# Patient Record
Sex: FEMALE | Race: WHITE
[De-identification: names, ages, dates, MRNs, and addresses within clinical notes are randomized per-mention and may not be internally consistent; named-entity substitution may affect disease eponyms.]

---

## 2017-04-03 ENCOUNTER — HOSPITAL ENCOUNTER (OUTPATIENT)
Dept: HOSPITAL 62 - RAD | Age: 77
End: 2017-04-03
Attending: ORTHOPAEDIC SURGERY
Payer: MEDICARE

## 2017-04-03 DIAGNOSIS — M13.851: Primary | ICD-10-CM

## 2017-04-03 PROCEDURE — 73501 X-RAY EXAM HIP UNI 1 VIEW: CPT

## 2017-04-03 PROCEDURE — 3E0U33Z INTRODUCTION OF ANTI-INFLAMMATORY INTO JOINTS, PERCUTANEOUS APPROACH: ICD-10-PCS | Performed by: RADIOLOGY

## 2017-04-03 PROCEDURE — 77002 NEEDLE LOCALIZATION BY XRAY: CPT

## 2017-04-03 PROCEDURE — 20610 DRAIN/INJ JOINT/BURSA W/O US: CPT

## 2017-06-13 ENCOUNTER — HOSPITAL ENCOUNTER (OUTPATIENT)
Dept: HOSPITAL 62 - WI | Age: 77
End: 2017-06-13
Attending: INTERNAL MEDICINE
Payer: MEDICARE

## 2017-06-13 DIAGNOSIS — Z12.31: Primary | ICD-10-CM

## 2017-06-13 PROCEDURE — 77063 BREAST TOMOSYNTHESIS BI: CPT

## 2017-06-13 PROCEDURE — 77067 SCR MAMMO BI INCL CAD: CPT

## 2017-06-13 PROCEDURE — G0202 SCR MAMMO BI INCL CAD: HCPCS

## 2017-06-13 NOTE — WOMENS IMAGING REPORT
EXAM DESCRIPTION:  3D SCREENING MAMMO BILAT



COMPLETED DATE/TIME:  6/13/2017 3:07 pm



REASON FOR STUDY:  Z12.31, ROUTINE SCREENING MAMMO Z12.31  ENCNTR SCREEN MAMMOGRAM FOR MALIGNANT NEOP
LASM OF NELLIE



COMPARISON:  2/17/2016 and 2/5/2015.



TECHNIQUE:  Standard craniocaudal and mediolateral oblique views of each breast recorded using digita
l acquisition and breast tomosynthesis.



LIMITATIONS:  None.



FINDINGS:  Findings present which are benign by mammographic criteria.  No suspicious masses, calcifi
cations or architectural distortion.

Pertinent benign findings: Stable small circumscribed masses in the right breast.  Stable calcificati
ons.

Read with the assistance of CAD.

.[facility] - R2 Cenova Version 1.3

.[facility] - R2 Cenova Version 1.3

.[facility] - R2 Cenova Version 2.4

.[facility] - R2 Cenova Version 2.4

.[facility] - R2  Version 9.2

Benign mammographic findings may include one or more of the following:  Smooth masses, popcorn/rim/co
arse calcifications, asymmetries, post-procedure changes, and lesions with long-standing stability.



IMPRESSION:  BENIGN MAMMOGRAPHIC FINDINGS.  BIRADS 2



BREAST DENSITY:  a. The breasts are almost entirely fatty.



BIRAD:  2 BENIGN FINDING(S)



RECOMMENDATION:  RECOMMENDATION: ROUTINE SCREENING



COMMENT:  The patient has been notified of the results by letter per SA requirements. Additional no
tification policies are in place for contacting patient with suspicious or incomplete findings.

Quality ID #225: The American College of Radiology recommends an annual screening mammogram for women
 aged 40 years or over. This facility utilizes a reminder system to ensure that all patients receive 
reminder letters, and/or direct phone calls for appointments. This includes reminders for routine scr
eening mammograms, diagnostic mammograms, or other Breast Imaging Interventions when appropriate.  Th
is patient will be placed in the appropriate reminder system.

The American College of Radiology (ACR) has developed recommendations for screening MRI of the breast
s in certain patient populations, to be used in conjunction with mammography.  Breast MRI surveillanc
e may be appropriate for women with more than 20% lifetime risk of developing breast cancer  as deter
mined by genetic testing, significant family history of the disease, or history of mantle radiation f
or Hodgkins Disease.  ACR Practice Guidelines 2008.

[employer]



PQRS 6045F: Fluoroscopic imaging is not utilized for breast tomosynthesis.



TECHNICAL DOCUMENTATION:  FINDING NUMBER: (1)

ASSESSMENT: (1)

JOB ID:  [id number]

 2011 [employer]- All Rights Reserved

## 2017-08-25 ENCOUNTER — HOSPITAL ENCOUNTER (EMERGENCY)
Dept: HOSPITAL 62 - ER | Age: 77
LOS: 1 days | Discharge: HOME | End: 2017-08-26
Payer: MEDICARE

## 2017-08-25 DIAGNOSIS — D64.9: ICD-10-CM

## 2017-08-25 DIAGNOSIS — Z79.82: ICD-10-CM

## 2017-08-25 DIAGNOSIS — S67.196A: ICD-10-CM
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
## 2017-08-25 DIAGNOSIS — Z88.8: ICD-10-CM
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
## 2017-08-25 DIAGNOSIS — L76.22: Primary | ICD-10-CM

## 2017-08-25 DIAGNOSIS — Z91.018: ICD-10-CM

## 2017-08-25 DIAGNOSIS — E11.9: ICD-10-CM

## 2017-08-25 DIAGNOSIS — Z96.641: ICD-10-CM

## 2017-08-25 LAB
ANION GAP SERPL CALC-SCNC: 9 MMOL/L (ref 5–19)
BASOPHILS # BLD AUTO: 0 10^3/UL (ref 0–0.2)
BASOPHILS NFR BLD AUTO: 0.3 % (ref 0–2)
BUN SERPL-MCNC: 13 MG/DL (ref 7–20)
CALCIUM: 10 MG/DL (ref 8.4–10.2)
CHLORIDE SERPL-SCNC: 98 MMOL/L (ref 98–107)
CO2 SERPL-SCNC: 28 MMOL/L (ref 22–30)
CREAT SERPL-MCNC: 0.8 MG/DL (ref 0.52–1.25)
EOSINOPHIL # BLD AUTO: 0.1 10^3/UL (ref 0–0.6)
EOSINOPHIL NFR BLD AUTO: 0.9 % (ref 0–6)
ERYTHROCYTE [DISTWIDTH] IN BLOOD BY AUTOMATED COUNT: 16.5 % (ref 11.5–14)
GLUCOSE SERPL-MCNC: 91 MG/DL (ref 75–110)
HCT VFR BLD CALC: 25.2 % (ref 36–47)
HGB BLD-MCNC: 8.3 G/DL (ref 12–15.5)
HGB HCT DIFFERENCE: -0.3
LYMPHOCYTES # BLD AUTO: 1.9 10^3/UL (ref 0.5–4.7)
LYMPHOCYTES NFR BLD AUTO: 15.9 % (ref 13–45)
MCH RBC QN AUTO: 28.4 PG (ref 27–33.4)
MCHC RBC AUTO-ENTMCNC: 32.9 G/DL (ref 32–36)
MCV RBC AUTO: 87 FL (ref 80–97)
MONOCYTES # BLD AUTO: 1 10^3/UL (ref 0.1–1.4)
MONOCYTES NFR BLD AUTO: 8.4 % (ref 3–13)
NEUTROPHILS # BLD AUTO: 8.8 10^3/UL (ref 1.7–8.2)
NEUTS SEG NFR BLD AUTO: 74.5 % (ref 42–78)
POTASSIUM SERPL-SCNC: 3.6 MMOL/L (ref 3.6–5)
PROTHROMBIN TIME: 13.2 SEC (ref 11.4–15.4)
RBC # BLD AUTO: 2.91 10^6/UL (ref 3.72–5.28)
SODIUM SERPL-SCNC: 134.5 MMOL/L (ref 137–145)
WBC # BLD AUTO: 11.9 10^3/UL (ref 4–10.5)

## 2017-08-25 PROCEDURE — 99284 EMERGENCY DEPT VISIT MOD MDM: CPT

## 2017-08-25 PROCEDURE — 85025 COMPLETE CBC W/AUTO DIFF WBC: CPT

## 2017-08-25 PROCEDURE — 85610 PROTHROMBIN TIME: CPT

## 2017-08-25 PROCEDURE — 80048 BASIC METABOLIC PNL TOTAL CA: CPT

## 2017-08-25 PROCEDURE — 36415 COLL VENOUS BLD VENIPUNCTURE: CPT

## 2017-08-25 NOTE — ER DOCUMENT REPORT
ED General





- General


Chief Complaint: Post Surgical Bleeding


Stated Complaint: LEG PAIN


Time Seen by Provider: 08/25/17 22:48


TRAVEL OUTSIDE OF THE [place] IN LAST 30 DAYS: No





- HPI


Notes: 





76-year-old female history of diabetes presents with bleeding from an incision 

from a total hip replacement she had on August 15 performed in [place] by 

Dr. Castellanos.  It has been bleeding for approximately 4 days but has been 

worsening the last day.  They contacted the orthopedist and did not have any 

recommendations except pressure which they did not feel helped.  She has no 

systemic symptoms with it, chest pain or breathing difficulty.  She is on baby 

aspirin but no other anticoagulants.  No specific pain.  She has had no fever.  

Denies fall.  Bleeding worsens with movement.  Denies fever or other systemic 

symptoms.  Hip replacement was performed due to what sounds like osteoarthritis 

but there was complication so a cable had to be placed.





- Related Data


Allergies/Adverse Reactions: 


 





spironolactone [From Aldactone] Allergy (Verified 08/26/17 00:00)


 


strawberry Allergy (Verified 08/26/17 00:00)


 








Home Medications: 


 Current Home Medications





Aspirin [Ecotrin 81 mg EC Tablet] 162 mg PO DAILY 08/26/17 [History]


Citalopram Hydrobromide [Citalopram HBr] 20 mg PO DAILY 08/26/17 [History]


Cyclosporine 0.05% Oph Emulsio [Restasis 0.05% Oph Emulsion Pf 0.4 ml] 1 drop 

BTH_EYE BID 08/26/17 [History]


Diltiazem HCl [Diltiazem 24Hr Cd] 360 mg PO DAILY 08/26/17 [History]


Metformin HCl [Metformin HCl ER] 750 mg PO DAILY 08/26/17 [History]


Oxycodone HCl 5 mg PO 6XD PRN 08/26/17 [History]


Pravastatin Sodium [Pravastatin Sodium] 40 mg PO DAILY 08/26/17 [History]


Valsartan [Diovan] 160 mg PO DAILY 08/26/17 [History]











Past Medical History





- Social History


Smoking Status: Never Smoker


Family History: Reviewed & Not Pertinent


Patient has suicidal ideation: No


Patient has homicidal ideation: No


Renal/ Medical History: Denies: Hx Peritoneal Dialysis





Review of Systems





- Review of Systems


-: Yes All other systems reviewed and negative





Physical Exam





- Vital signs


Vitals: 


 











Temp Pulse Resp BP Pulse Ox


 


 98.6 F   99   17   130/60 H  98 


 


 08/25/17 22:44  08/25/17 22:44  08/25/17 22:44  08/25/17 22:44  08/25/17 22:44











Interpretation: Hypertensive - Slightly





- Notes


Notes: 





GENERAL: VS as per nursing doc. Well-appearing, well-nourished and in no acute 

distress.





HEAD: Atraumatic, normocephalic.





EYES: Pupils equal round and reactive to light, extraocular movements intact, 

sclera anicteric, no conjunctival injection or discharge.





ENT: Nares patent, oropharynx clear without exudates, moist mucous membranes.





NECK: Normal range of motion, supple without lymphadenopathy.





LUNGS: Breath sounds clear to auscultation bilaterally and equal.  No wheezes 

rales or rhonchi.





HEART: Regular rate and rhythm without murmurs.





ABDOMEN: Soft, non-tender, normoactive bowel sounds.  No guarding, no rebound.  

No masses appreciated.  No [place] sign.





BACK: No CVA tenderness.





EXTREMITIES: Normal range of motion, no calf tenderness, no edema.  There is a 

small subungual hematoma and bruising of the distal phalanx of the right fifth 

digit.  Good but slight decreased range of motion of the right lower extremity 

without pain elicited.





NEUROLOGICAL:  Normal speech. Normal sensory and motor exams. No gross 

cerebellar abnormalities.





PSYCH: Normal mood, normal affect.





SKIN: Warm, dry, there of active oozing from the right leg incision.  This 

appears slightly more anterior lateral then completely lateral like some hip 

incisions.  Appears to be closed with subcutaneous sutures.  At the very 

superior edge there is a very small 2 mm "hole" with active oozing of 

sanguinous fluid.  It appears thinner than blood and there is no coagulating.  

Palpation over the incision itself shows no fluctuance but does increase the 

oozing.  There is no pulsatile bleeding.  There is no erythema or warmth but 

just some old bruising.








Course





- Re-evaluation


Re-evalutation: 





08/26/17 00:08


I discussed with the bleeding and labs with Dr. Jarrell from [facility], 

covering for Dr. Castellanos.  We discussed treatment options and he recommended 

further pressure with a pressure dressing as well as follow-up with her 

orthopedist on Monday.


08/26/17 00:23


I discussed the plan with the patient and family and they were comfortable with 

attempting the dressing at home.  I have placed sterile 4 x 4's over the 

incision covered by an ABD.  Multiple Ace wraps were used which were wrapped 

around her upper thigh then anchored around her waist which appeared to give 

good pressure.  With some observation no significant bleeding was noted.  She 

was able to stand on her legs to help assist with this.  They understand follow-

up needs.





- Vital Signs


Vital signs: 


 











Temp Pulse Resp BP Pulse Ox


 


 98.6 F   99   17   130/60 H  98 


 


 08/25/17 22:44  08/25/17 22:44  08/25/17 22:44  08/25/17 22:44  08/25/17 22:44














- Laboratory


Result Diagrams: 


 08/25/17 23:00





 08/25/17 23:00


Laboratory results interpreted by me: 


 











  08/25/17 08/25/17





  23:00 23:00


 


WBC  11.9 H 


 


RBC  2.91 L 


 


Hgb  8.3 L 


 


Hct  25.2 L 


 


RDW  16.5 H 


 


Absolute Neutrophils  8.8 H 


 


Sodium   134.5 L














Discharge





- Discharge


Clinical Impression: 


 Postoperative bleeding from incision, Anemia, Injury, crush, finger





Condition: Good


Disposition: HOME, SELF-CARE


Additional Instructions: 


Please return if the bleeding appears to be worsening or for other concern.  

Follow-up with your orthopedist on Monday as discussed.  Maintain the dressing 

and Ace wraps as you can.  Light activity for the next 48 hours.


Referrals: 


CLAIRE DE LA GARZA DO [Primary Care Provider] - Follow up as needed

## 2017-08-25 NOTE — RADIOLOGY REPORT (SQ)
EXAM DESCRIPTION:  HIP RIGHT AP/LATERAL



COMPLETED DATE/TIME:  8/25/2017 11:31 pm



REASON FOR STUDY:  Post surgical pain/bleeding.



COMPARISON:  None.



NUMBER OF VIEWS:  Two views.



TECHNIQUE:  AP pelvis and additional frog-leg view of the right hip.



LIMITATIONS:  None.



FINDINGS:  MINERALIZATION: Normal.

RIGHT HIP: Total hip arthroplasty without acute osseous abnormality or hardware complication.

LEFT HIP: Total hip arthroplasty without acute osseous abnormality or hardware complication.

PUBIS AND ISCHIUM: No fracture.

PELVIS: No fracture.

SACRUM: No fracture or dislocation. No worrisome bone lesions.

LOWER LUMBAR SPINE: No fracture or dislocation. No worrisome bone lesions.  No significant disc disea
se.

SOFT TISSUES: No findings.

OTHER: No other significant finding.



IMPRESSION:  NO ACUTE OSSEOUS ABNORMALITY OR HARDWARE COMPLICATION IDENTIFIED.



TECHNICAL DOCUMENTATION:  JOB ID:  [id number]

 2011 [employer]- All Rights Reserved

## 2017-08-26 VITALS — SYSTOLIC BLOOD PRESSURE: 137 MMHG | DIASTOLIC BLOOD PRESSURE: 77 MMHG

## 2017-10-30 ENCOUNTER — HOSPITAL ENCOUNTER (OUTPATIENT)
Dept: HOSPITAL 62 - WELLCARE | Age: 77
End: 2017-10-30
Attending: SPECIALIST
Payer: MEDICARE

## 2017-10-30 DIAGNOSIS — E03.1: ICD-10-CM

## 2017-10-30 DIAGNOSIS — N18.3: Primary | ICD-10-CM

## 2017-10-30 DIAGNOSIS — B96.5: ICD-10-CM

## 2017-10-30 LAB
ALBUMIN SERPL-MCNC: 3.9 G/DL (ref 3.5–5)
ALP SERPL-CCNC: 167 U/L (ref 38–126)
ALT SERPL-CCNC: 21 U/L (ref 9–52)
ANION GAP SERPL CALC-SCNC: 13 MMOL/L (ref 5–19)
AST SERPL-CCNC: 15 U/L (ref 14–36)
BASOPHILS # BLD AUTO: 0 10^3/UL (ref 0–0.2)
BASOPHILS NFR BLD AUTO: 0.5 % (ref 0–2)
BILIRUB DIRECT SERPL-MCNC: 0.4 MG/DL (ref 0–0.4)
BILIRUB SERPL-MCNC: 0.4 MG/DL (ref 0.2–1.3)
BUN SERPL-MCNC: 11 MG/DL (ref 7–20)
CALCIUM: 10.2 MG/DL (ref 8.4–10.2)
CHLORIDE SERPL-SCNC: 107 MMOL/L (ref 98–107)
CO2 SERPL-SCNC: 24 MMOL/L (ref 22–30)
CREAT SERPL-MCNC: 0.73 MG/DL (ref 0.52–1.25)
EOSINOPHIL # BLD AUTO: 0.1 10^3/UL (ref 0–0.6)
EOSINOPHIL NFR BLD AUTO: 1.8 % (ref 0–6)
ERYTHROCYTE [DISTWIDTH] IN BLOOD BY AUTOMATED COUNT: 18 % (ref 11.5–14)
ERYTHROCYTE [SEDIMENTATION RATE] IN BLOOD: 21 MM/HR (ref 0–30)
GLUCOSE SERPL-MCNC: 85 MG/DL (ref 75–110)
HCT VFR BLD CALC: 33.1 % (ref 36–47)
HGB BLD-MCNC: 10.7 G/DL (ref 12–15.5)
HGB HCT DIFFERENCE: -1
LYMPHOCYTES # BLD AUTO: 1.4 10^3/UL (ref 0.5–4.7)
LYMPHOCYTES NFR BLD AUTO: 18 % (ref 13–45)
MCH RBC QN AUTO: 26.5 PG (ref 27–33.4)
MCHC RBC AUTO-ENTMCNC: 32.5 G/DL (ref 32–36)
MCV RBC AUTO: 82 FL (ref 80–97)
MONOCYTES # BLD AUTO: 0.5 10^3/UL (ref 0.1–1.4)
MONOCYTES NFR BLD AUTO: 6.5 % (ref 3–13)
NEUTROPHILS # BLD AUTO: 5.6 10^3/UL (ref 1.7–8.2)
NEUTS SEG NFR BLD AUTO: 73.2 % (ref 42–78)
POTASSIUM SERPL-SCNC: 3.7 MMOL/L (ref 3.6–5)
PROT SERPL-MCNC: 6.7 G/DL (ref 6.3–8.2)
RBC # BLD AUTO: 4.05 10^6/UL (ref 3.72–5.28)
SODIUM SERPL-SCNC: 144 MMOL/L (ref 137–145)
WBC # BLD AUTO: 7.6 10^3/UL (ref 4–10.5)

## 2017-10-30 PROCEDURE — 85025 COMPLETE CBC W/AUTO DIFF WBC: CPT

## 2017-10-30 PROCEDURE — 80053 COMPREHEN METABOLIC PANEL: CPT

## 2017-10-30 PROCEDURE — 85652 RBC SED RATE AUTOMATED: CPT

## 2018-06-01 ENCOUNTER — HOSPITAL ENCOUNTER (OUTPATIENT)
Dept: HOSPITAL 62 - WI | Age: 78
End: 2018-06-01
Attending: NURSE PRACTITIONER
Payer: MEDICARE

## 2018-06-01 DIAGNOSIS — Z12.31: Primary | ICD-10-CM

## 2018-06-01 PROCEDURE — 77067 SCR MAMMO BI INCL CAD: CPT

## 2018-06-01 NOTE — WOMENS IMAGING REPORT
EXAM DESCRIPTION:  BILAT SCREENING MAMMO W/CAD



COMPLETED DATE/TIME:  6/1/2018 9:45 am



REASON FOR STUDY:  SCREENING MAMMO Z12.31  ENCNTR SCREEN MAMMOGRAM FOR MALIGNANT NEOPLASM OF NELLIE



COMPARISON:  2014 to 2017



TECHNIQUE:  Standard craniocaudal and mediolateral oblique views of each breast recorded using digita
l acquisition.



LIMITATIONS:  None.



FINDINGS:  No masses, calcifications or architectural distortion. No areas of suspicion.

Read with the assistance of CAD.

.[facility] - R2 Cenova Version 1.3

.[facility] - R2 Cenova Version 1.3

.[facility] - R2 Cenova Version 2.4

.[facility] - R2 Cenova Version 2.4

.[facility] - R2  Version 9.2



IMPRESSION:  NORMAL MAMMOGRAM.  BIRADS 1.



BREAST DENSITY:  b. There are scattered areas of fibroglandular density.



BIRAD:  1 NEGATIVE



RECOMMENDATION:  ROUTINE SCREENING



COMMENT:  The patient has been notified of the results by letter per MQSA requirements. Additional no
tification policies are in place for contacting patient with suspicious or incomplete findings.

Quality ID #225: The American College of Radiology recommends an annual screening mammogram for women
 aged 40 years or over. This facility utilizes a reminder system to ensure that all patients receive 
reminder letters, and/or direct phone calls for appointments. This includes reminders for routine scr
eening mammograms, diagnostic mammograms, or other Breast Imaging Interventions when appropriate.  Th
is patient will be placed in the appropriate reminder system.

The American College of Radiology (ACR) has developed recommendations for screening MRI of the breast
s in certain patient populations, to be used in conjunction with mammography.  Breast MRI surveillanc
e may be appropriate for women with more than 20% lifetime risk of developing breast cancer  as deter
mined by genetic testing, significant family history of the disease, or history of mantle radiation f
or Hodgkins Disease.  ACR Practice Guidelines 2008.



TECHNICAL DOCUMENTATION:  FINDING NUMBER: (1)

ASSESSMENT: (1)

JOB ID:  [id number]

 2011 [employer]- All Rights Reserved



Reading location - IP/workstation name: [email or web address]

## 2018-07-17 NOTE — RADIOLOGY REPORT (SQ)
"""Recommend YAG Capsulotomy OS today. Discussed the RBA questions answered. "" EXAM DESCRIPTION:  FINGER RIGHT



COMPLETED DATE/TIME:  8/25/2017 11:31 pm



REASON FOR STUDY:  Pain with trauma



COMPARISON:  None.



NUMBER OF VIEWS:  Three views.



TECHNIQUE:  AP, lateral, and oblique images acquired of the right fifth finger.



LIMITATIONS:  None.



FINDINGS:  MINERALIZATION: Normal.

BONES: No acute fracture or dislocation.  No worrisome bone lesions.

SOFT TISSUES: Soft tissue swelling at the level of the distal phalanx.  No radiopaque foreign body.

OTHER: No other significant finding.



IMPRESSION:  SOFT TISSUE SWELLING WITHOUT FRACTURE OR ACUTE OSSEOUS ABNORMALITY.



COMMENT:  SITE OF TRAUMA/COMPLAINT MARKED/STAMP COMPLETED: YES.



TECHNICAL DOCUMENTATION:  JOB ID:  [id number]

 2011 [employer]- All Rights Reserved

## 2019-06-13 ENCOUNTER — HOSPITAL ENCOUNTER (OUTPATIENT)
Dept: HOSPITAL 62 - WI | Age: 79
End: 2019-06-13
Attending: FAMILY MEDICINE
Payer: MEDICARE

## 2019-06-13 DIAGNOSIS — Z12.31: Primary | ICD-10-CM

## 2019-06-13 PROCEDURE — 77067 SCR MAMMO BI INCL CAD: CPT

## 2019-06-13 NOTE — WOMENS IMAGING REPORT
EXAM DESCRIPTION:  BILAT SCREENING MAMMO W/CAD



COMPLETED DATE/TIME:  6/13/2019 1:12 pm



REASON FOR STUDY:  Z12.31 ENCOUNTER FOR SCREENING MAMMOGRAM FOR MALIGNANT NEOPLASM OF BREAST Z12.31  
ENCNTR SCREEN MAMMOGRAM FOR MALIGNANT NEOPLASM OF NELLIE



COMPARISON:  Multiple since 2009



EXAM PARAMETERS:  Standard craniocaudal and mediolateral oblique views of each breast recorded using 
digital acquisition.

Read with the assistance of CAD.

.[facility]  Version 9.2



LIMITATIONS:  None.



FINDINGS:  No suspicious masses, suspicious calcifications or architectural distortion. No areas of s
uspicion.



IMPRESSION:  Negative MAMMOGRAM.  BIRADS 1



BREAST DENSITY:  a. The breasts are almost entirely fatty.



BIRAD:  ASSESSMENT:  1 NEGATIVE



RECOMMENDATION:  ROUTINE SCREENING



COMMENT:  The patient has been notified of the results by letter per MQSA requirements. Additional no
tification policies are in place for contacting patient with suspicious or incomplete findings.

Quality ID #225: The American College of Radiology recommends an annual screening mammogram for women
 aged 40 years or over. This facility utilizes a reminder system to ensure that all patients receive 
reminder letters, and/or direct phone calls for appointments. This includes reminders for routine scr
eening mammograms, diagnostic mammograms, or other Breast Imaging Interventions when appropriate.  Th
is patient will be placed in the appropriate reminder system.



TECHNICAL DOCUMENTATION:  FINDING NUMBER: (1)

ASSESSMENT: (1)

JOB ID:  [id number]

 2011 [employer]- All Rights Reserved



Reading location - IP/workstation name: [facility]

## 2020-03-09 ENCOUNTER — HOSPITAL ENCOUNTER (OUTPATIENT)
Dept: HOSPITAL 62 - RAD | Age: 80
End: 2020-03-09
Attending: OTOLARYNGOLOGY
Payer: MEDICARE

## 2020-03-09 DIAGNOSIS — H74.8X2: Primary | ICD-10-CM

## 2020-03-09 PROCEDURE — 70482 CT ORBIT/EAR/FOSSA W/O&W/DYE: CPT
